# Patient Record
(demographics unavailable — no encounter records)

---

## 2025-03-12 NOTE — DISCUSSION/SUMMARY
[FreeTextEntry1] : 28 yo  lmp  pregnancy confirmation lexie 10/26 H/O  DELIVERY , H/O C/S TOLAC H/O LOVENOX AND ASA W PREGNANCY SONO -siup AT 6.5 FHR POS MFM CONSULT NT TO SCHEDULE WILL START ASA 81 MG , LOVENOX AT 16 WKS LABS IN 4 WKS' PNV ZOFRAN , B6  RTO 1 MO

## 2025-03-12 NOTE — HISTORY OF PRESENT ILLNESS
[FreeTextEntry1] : 28 YO  LMP 25 Patient is here for evaluation amenorrhea  , Urine pregnancy test-  positive  H/O  delivery due to chorio c/s and  ft  [TextBox_4] : GY[facility]X No history of fibroids, cysts, or STDs [PapSmeardate] : 2023

## 2025-03-12 NOTE — HISTORY OF PRESENT ILLNESS
[FreeTextEntry1] : 26 YO  LMP 25 Patient is here for evaluation amenorrhea  , Urine pregnancy test-  positive  H/O  delivery due to chorio c/s and  ft  [TextBox_4] : GYNHX No history of fibroids, cysts, or STDs [PapSmeardate] : 2023

## 2025-03-12 NOTE — PROCEDURE
[Amenorrhea] : Amenorrhea [Transvaginal Ultrasound] : transvaginal ultrasound [FreeTextEntry4] : siup at 6.5 wks fhr pos edd10/20 fhr pos

## 2025-04-21 NOTE — HISTORY OF PRESENT ILLNESS
[FreeTextEntry1] : [facility] Initial Consult Note  Patient is a 28yo  at 13w1d GA PARAMJIT 10/26/25 by LMP consistent with first trimester sono presenting for consultation for APLS. Currently managed by Dr. Hutchinson.   Patient is doing well. As her first pregnancy was complicated by a placental abruption, she was tested for APLS. In her last pregnancy she was found to be positive for [id number]. She was put on lovenox for her prior pregnancy and delivered her second pregnancy full term. She was meant to repeat the labs postpartum, however they were never repeated. She reports that for the past 4 days she has had thick white discharge and vaginal pruritis. She denies contractions, leakage of fluid, vaginal bleeding.  All: NKDA Meds: denies  OB Hx:   P1:  28w CS, suspected abruption, 1280gm P2:  38w , was on lovenox starting 12 weeks for suspected APLS h/o 2 SAB, 1 D+C  Gyn Hx: denies h/o fibroids, ovarian cysts, abnormal PAPs, STIs  PMH: denies  PSH:  delivery  FH: denies SH: currently unemployed, lives with  and children, denies, smoking, alcohol or drug use   Psych: denies  Genetics: denies h/o genetic abnormalities   Physical Exam: BP: 99/67, HR: 102bpm, O2sat: 99%, Wt: 106lbs,  GA: AOx3, NAD  Heart: RRR, no M/R/G  Lungs: CTAB  Abd: soft, nontender, nondistended, gravid  LE: no erythema, edema or pain   Labs:  prenatal labs not yet completed  OBUS:  : S=D; nl NT; tiny 1.8 cm intramural myoma.  A/P: 28yo  at 13w1d GA, suspected APLS  # APLS  - We discussed the criteria for APS (antiphospholipid syndrome) . The diagnosis of APS is made when one clinical and one laboratory criteria are present. The clinical criteria are: 1) vascular thrombosis 2) Pregnancy morbidity as evidenced by one or more unexplained deaths of morphologically normal fetuses at or after 10 weeks gestation, or one or more premature births of morphologically normal neonates before 34 weeks due to severe preeclampsia or placental insufficiency or 3) three or more otherwise unexplained consecutive spontaneous abortions before 10 weeks. The laboratory criteria are: lupus anticoagulant present on two or more occasions at least 12 weeks apart, or anticardiolipin IgG or IgM in medium or high titer (> 40 GPL or MPL), on two or more occasions, at least 12 weeks apart, or beta 2 glycoprotein IgG or IgM antibodies > 99th percentile (> 20) present on two or more occasions at least 12 weeks apart.   - During pregnancy and 6 weeks postpartum prophylactic heparin or Lovenox and Aspirin 81 mg PO daily should be considered even in patients who dont have a personal history of thrombosis. The goal is to reduce the risks of complications of antiphospholipid antibody syndrome such as thrombosis (5-12% risk), preeclampsia (5.5-8 fold increased risk), fetal growth restriction, (15-30% risk) and stillbirth. I also discussed the addition of antepartum surveillance by 32 weeks (ACOG practice bulletin 132). - as patient did not repeat her lab work outside of pregnancy, cannot strictly diagnose APLS as she does not meet criteria - ordered repeat APLS work up, if negative is informative and helps to potentially rule out APLS, if positive will still need to be repeated outside of pregnancy - Lovenox 40mg SQ QD ordered; patient told to start after she has blood drawn, but not to wait for the results before starting Lovenox.  # vaginitis, suspect candida - fu vaginitis performed in office - fluconazole 150mg PO once ordered  # short interval pregnancy  # Pregnancy - Rh neg, recommend rhogam pre routine - recommend routine prenatal labs - AFP at 16 weeks  RTC in 4 weeks to go over lab work results and early anatomy
Yes

## 2025-05-21 NOTE — HISTORY OF PRESENT ILLNESS
[FreeTextEntry1] : [facility] Initial Follow up Note  Patient is a 26yo  at 17w3d GA PARAMJIT 10/26/25 by LMP consistent with first trimester sono presenting for consultation for APLS. Currently managed by Dr. Hutchinson.   Today: Feeling well, denies ctx, lof, vb. + FM. Not taking Lovenox.  Recently moved to [place], has not been able to go to lab for bloodwork because of her move.   History: As her first pregnancy was complicated by a placental abruption, she was tested for APLS. In her last pregnancy she was found to be positive for [id number]. She was put on Lovenox for her prior pregnancy and delivered her second pregnancy full term. She was meant to repeat the labs postpartum, however they were never repeated.   All: NKDA Meds: denies  OB Hx:   P1:  28w CS, suspected abruption, 1280gm P2:  38w , was on lovenox starting 12 weeks for suspected APLS h/o 2 SAB, 1 D+C  Gyn Hx: denies h/o fibroids, ovarian cysts, abnormal PAPs, STIs  PMH: denies  PSH:  delivery  FH: denies SH: currently unemployed, lives with  and children, denies, smoking, alcohol or drug use   Psych: denies  Genetics: denies h/o genetic abnormalities   Physical Exam: BP: 98/62, HR: 93bpm, O2sat: 99%,  GA: AOx3, NAD  Heart: RRR, no M/R/G  Lungs: CTAB  Abd: soft, nontender, nondistended, gravid  LE: no erythema, edema or pain   Labs:  prenatal labs not yet completed  OBUS:  : S=D; nl NT; tiny 1.8 cm intramural myoma. : 17w3d: S=D; nl early anatomy. tiny 2 cm intramural myoma.  A/P: 26yo  at 17w3d GA, suspected APLS; did not repeat labs as ordered.  # APLS  - We discussed the criteria for APS (antiphospholipid syndrome) . The diagnosis of APS is made when one clinical and one laboratory criteria are present. The clinical criteria are: 1) vascular thrombosis 2) Pregnancy morbidity as evidenced by one or more unexplained deaths of morphologically normal fetuses at or after 10 weeks gestation, or one or more premature births of morphologically normal neonates before 34 weeks due to severe preeclampsia or placental insufficiency or 3) three or more otherwise unexplained consecutive spontaneous abortions before 10 weeks. The laboratory criteria are: lupus anticoagulant present on two or more occasions at least 12 weeks apart, or anticardiolipin IgG or IgM in medium or high titer (> 40 GPL or MPL), on two or more occasions, at least 12 weeks apart, or beta 2 glycoprotein IgG or IgM antibodies > 99th percentile (> 20) present on two or more occasions at least 12 weeks apart.   - During pregnancy and 6 weeks postpartum prophylactic heparin or Lovenox and Aspirin 81 mg PO daily should be considered even in patients who dont have a personal history of thrombosis. The goal is to reduce the risks of complications of antiphospholipid antibody syndrome such as thrombosis (5-12% risk), preeclampsia (5.5-8 fold increased risk), fetal growth restriction, (15-30% risk) and stillbirth. I also discussed the addition of antepartum surveillance by 32 weeks (ACOG practice bulletin 132). - as patient did not repeat her lab work outside of pregnancy, cannot strictly diagnose APLS as she does not meet criteria - Last visit: ordered repeat APLS work up, but never done, if negative is informative and helps to potentially rule out APLS, if positive will still need to be repeated outside of pregnancy - Lovenox 40mg SQ QD ordered; patient told to start after she has blood drawn, but not to wait for the results before starting Lovenox. Patient said she will now do the tests. Full prenatal labs ordered as well.  # Vaginitis  - Tested positive for candida, treated with fluconazole (2025)   # short interval pregnancy  # Pregnancy - Rh neg, recommend rhogam per routine - Ordered T1 labs, + NIPT, carrier screen, and afp at today's visit. f/u results  RTC in 3 weeks to go over lab work results and anatomy

## 2025-05-21 NOTE — HISTORY OF PRESENT ILLNESS
[FreeTextEntry1] : [facility] Initial Follow up Note  Patient is a 26yo  at 17w3d GA PARAMJIT 10/26/25 by LMP consistent with first trimester sono presenting for consultation for APLS. Currently managed by Dr. Hutchinson.   Today: Feeling well, denies ctx, lof, vb. + FM. Not taking Lovenox.  Recently moved to [place], has not been able to go to lab for bloodwork because of her move.   History: As her first pregnancy was complicated by a placental abruption, she was tested for APLS. In her last pregnancy she was found to be positive for [id number]. She was put on Lovenox for her prior pregnancy and delivered her second pregnancy full term. She was meant to repeat the labs postpartum, however they were never repeated.   All: NKDA Meds: denies  OB Hx:   P1:  28w CS, suspected abruption, 1280gm P2:  38w , was on lovenox starting 12 weeks for suspected APLS h/o 2 SAB, 1 D+C  Gyn Hx: denies h/o fibroids, ovarian cysts, abnormal PAPs, STIs  PMH: denies  PSH:  delivery  FH: denies SH: currently unemployed, lives with  and children, denies, smoking, alcohol or drug use   Psych: denies  Genetics: denies h/o genetic abnormalities   Physical Exam: BP: 98/62, HR: 93bpm, O2sat: 99%,  GA: AOx3, NAD  Heart: RRR, no M/R/G  Lungs: CTAB  Abd: soft, nontender, nondistended, gravid  LE: no erythema, edema or pain   Labs:  prenatal labs not yet completed  OBUS:  : S=D; nl NT; tiny 1.8 cm intramural myoma. : 17w3d: S=D; nl early anatomy. tiny 2 cm intramural myoma.  A/P: 26yo  at 17w3d GA, suspected APLS; did not repeat labs as ordered.  # APLS  - We discussed the criteria for APS (antiphospholipid syndrome) . The diagnosis of APS is made when one clinical and one laboratory criteria are present. The clinical criteria are: 1) vascular thrombosis 2) Pregnancy morbidity as evidenced by one or more unexplained deaths of morphologically normal fetuses at or after 10 weeks gestation, or one or more premature births of morphologically normal neonates before 34 weeks due to severe preeclampsia or placental insufficiency or 3) three or more otherwise unexplained consecutive spontaneous abortions before 10 weeks. The laboratory criteria are: lupus anticoagulant present on two or more occasions at least 12 weeks apart, or anticardiolipin IgG or IgM in medium or high titer (> 40 GPL or MPL), on two or more occasions, at least 12 weeks apart, or beta 2 glycoprotein IgG or IgM antibodies > 99th percentile (> 20) present on two or more occasions at least 12 weeks apart.   - During pregnancy and 6 weeks postpartum prophylactic heparin or Lovenox and Aspirin 81 mg PO daily should be considered even in patients who dont have a personal history of thrombosis. The goal is to reduce the risks of complications of antiphospholipid antibody syndrome such as thrombosis (5-12% risk), preeclampsia (5.5-8 fold increased risk), fetal growth restriction, (15-30% risk) and stillbirth. I also discussed the addition of antepartum surveillance by 32 weeks (ACOG practice bulletin 132). - as patient did not repeat her lab work outside of pregnancy, cannot strictly diagnose APLS as she does not meet criteria - Last visit: ordered repeat APLS work up, but never done, if negative is informative and helps to potentially rule out APLS, if positive will still need to be repeated outside of pregnancy - Lovenox 40mg SQ QD ordered; patient told to start after she has blood drawn, but not to wait for the results before starting Lovenox. Patient said she will now do the tests. Full prenatal labs ordered as well.  # Vaginitis  - Tested positive for candida, treated with fluconazole (2025)   # short interval pregnancy  # Pregnancy - Rh neg, recommend rhogam per routine - Ordered T1 labs, + NIPT, carrier screen, and afp at today's visit. f/u results  RTC in 3 weeks to go over lab work results and anatomy

## 2025-05-28 NOTE — OB SUMMARY
[Date: _____] : Date: [unfilled] [Gestational Age: _____] : Gestational Age: [unfilled] [FreeTextEntry2] : siup at 18+ wks, ? h/o APLS, for tolac MFM consult appreciated  did not start lovenox yet  will start she is no on ASA  will f/up level 2 in 3 wks prenatal labs , afp today  rto 1 mo [FreeTextEntry1] : Patient is here for follow/up pregnancy weight-   101 B/P-      108/63  pulse-   101 urine  , glucose-  negative , protein-  negative  [de-identified] : go

## 2025-06-11 NOTE — HISTORY OF PRESENT ILLNESS
Bariatric/Medical Weight Loss Progress Report     Referral Diagnosis:   Obesity (BMI 30-39.9) [E66.9]   S/P laparoscopic sleeve gastrectomy [Z98.84]   Intestinal malabsorption following gastrectomy [K91.2, Z90.3]   Nausea [R11.0]   Anxiety [F41.9]     Post op follow up #3    Length of Appointment Time:  26 minutes    Assessment:  Amount of food: Per 24 hour recall: Woke at 5am. Breakfast at 8:15am: [place] Balanced Break snack pack with cheese, cashews, Craisins. Lunch at 11:30am: sandwich on 1 piece wheat bread with turkey, cheese, lettuce. PM snacks: salad with light Italian dressing, 1/2 cup watermelon, 1/4 cup Carbmaster crackers. Dinner at 6:30pm: 1/3 cup lasagna. HS snack: string cheese, skim milk. Bed at 10pm.     Type of food/meals: Patient typically grazes throughout the day. She is a member of bariatric groups on Facebook. She is bored of drinking Premier Protein shakes.      Bariatric Diet: regular  Total Fluid intake: 40 oz Tazo sweetened Obi tea with 6 oz skim milk, 20 oz water with sugar free lemon hydration, 32 oz water, 12 oz Bubblr, 8 oz skim milk.    Total protein intake: 56 g  Protein Supplement: none - bored of drinking Premier Protein shakes  # Feedings/day: 5-8  Size of Meal: 1 cup  Time to Eat: 30 minutes  Gum Use: some  Straw Use: yes  Drinking while eating: no  Fried Foods: few fries  Fast Food: no  Eating Out: 1x/month  Sweets: occasionally - chocolate  Fruit: yes  Nuts: yes  Starches: yes - tries to avoid  Soda: none, does drink Bubblr  Alcohol: special occasions  Allergies/Intolerances: none    Weight loss medication: qsymia  Medications, specify prescription or OTC: prilosec  Vitamins/Minerals: BariatricPal MVI, calcium citrate chews 4x/day    Behavior  Emotional related eating behaviors: reports none    Participation in [place] programs: not addressed     Physical Activity  Type of physical activity: Crossfit 3x/week for 1 hour, walking or biking 3x/week      Anthropometric  [FreeTextEntry1] : 26yo  at 20w3d GA PARAMJIT 10/26/25 by LMP consistent with first trimester sono presenting for f/u of suspected APLS. Currently managed by Dr. Hutchinson.   Today: Feeling well, denies ctx, lof, vb. + FM. Taking Lovenox.    History: As her first pregnancy was complicated by a placental abruption, she was tested for APLS. In her last pregnancy she was found to be positive for [id number]. She was put on Lovenox for her prior pregnancy and delivered her second pregnancy full term. She was meant to repeat the labs postpartum, however they were never repeated. Started Lovenox last visit and repeated APLS - neg.  All: NKDA Meds: denies  OB Hx:   P1:  28w CS, suspected abruption, 1280gm P2:  38w , was on lovenox starting 12 weeks for suspected APLS h/o 2 SAB, 1 D+C  Gyn Hx: denies h/o fibroids, ovarian cysts, abnormal PAPs, STIs  PMH: denies  PSH:  delivery  FH: denies SH: currently unemployed, lives with  and children, denies, smoking, alcohol or drug use   Psych: denies  Genetics: denies h/o genetic abnormalities   Physical Exam: BP: 105/68, HR: 91bpm, O2sat: 100%,  GA: AOx3, NAD   Labs:  prenatal labs: Normal MCV & Hemoglobin electrophoresis (last preg). Hb 10.7 HbA1c 4.7% HBsAg/Hep C neg, Rubella N/A. HIV//RPR no result yet. Blood type AB neg; antibody screen negative. Panorama negative for Trisomy 13, 18 & 21, common microdeletion panel; fetal fraction 21.9% Female fetus.  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 : 17w3d: S=D; nl early anatomy. tiny 2 cm intramural myoma. : 20w3d: S=D; nl anatomy. tiny 2 cm intramural myoma. Possible echogenic kidneys - re-eval in 3 weeks.  A/P: 26yo  at 20w3d GA, suspected APLS; did not repeat labs as ordered.  # APLS  - We discussed the criteria for APS (antiphospholipid syndrome) . The diagnosis of APS is made when one clinical and one laboratory criteria are present. The clinical criteria are: 1) vascular thrombosis 2) Pregnancy morbidity as evidenced by one or more unexplained deaths of morphologically normal fetuses at or after 10 weeks gestation, or one or more premature births of morphologically normal neonates before 34 weeks due to severe preeclampsia or placental insufficiency or 3) three or more otherwise unexplained consecutive spontaneous abortions before 10 weeks. The laboratory criteria are: lupus anticoagulant present on two or more occasions at least 12 weeks apart, or anticardiolipin IgG or IgM in medium or high titer (> 40 GPL or MPL), on two or more occasions, at least 12 weeks apart, or beta 2 glycoprotein IgG or IgM antibodies > 99th percentile (> 20) present on two or more occasions at least 12 weeks apart.   - During pregnancy and 6 weeks postpartum prophylactic heparin or Lovenox and Aspirin 81 mg PO daily should be considered even in patients who dont have a personal history of thrombosis. The goal is to reduce the risks of complications of antiphospholipid antibody syndrome such as thrombosis (5-12% risk), preeclampsia (5.5-8 fold increased risk), fetal growth restriction, (15-30% risk) and stillbirth. I also discussed the addition of antepartum surveillance by 32 weeks (ACOG practice bulletin 132). - as patient did not repeat her lab work outside of pregnancy, cannot strictly diagnose APLS as she does not meet criteria - Repeat APLS work up, negative; potentially ruled out APLS. - Lovenox 40mg SQ QD ordered; patient told to start after she has blood drawn, but not to wait for the results before starting Lovenox. Patient is taking Lovenox and desires to continue despite repeat APLS labs negative  # Anemia: Hb 10.7 Rx Fe polysaccharide 150 mg qd and Vitamin C 500 mg BID.   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 - reports she got req for partner from [facility] yesterday  RTC in 3 weeks for sono - transferring to [place]   Measurements:   Height: 5' 1.75\"  Initial weight: 219#  Lowest weight: 150#  Personal goal weight: 140-145#    Weight on:  1/16/24: 169#  3/19/24: 172#  6/19/24: 171#    Biochemical Data, Medical Tests, and Procedures  1/31/2017- s/p gastric sleeve    Recent labs reviewed:  Creatinine (mg/dL)   Date Value   03/06/2024 1.04 (H)     Cholesterol (mg/dL)   Date Value   03/06/2024 239 (H)     LDL (mg/dL)   Date Value   03/06/2024 134 (H)     Vitamin D  Date  03/06/2024  25.6 (L)     Nutrition-Focused Physical Findings:   Diarrhea: none  Constipation: none  Hair loss: none  Dumping: none  Nausea/Vomiting: none     Client History:   Past Medical History:   Diagnosis Date    Dysmenorrhea     Headache(784.0)     Hypercholesterolemia 01/27/2017    Menorrhagia     Obstructive sleep apnea     cpap    Severe obesity (BMI 35.0-35.9 with comorbidity)  (CMD) 01/31/2017    Status post sleeve gastrectomy 1/31/17    Thiamine deficiency 01/27/2017    Vitamin D deficiency 01/27/2017     Nutrition Diagnosis:   Altered GI function related to sleeve gastrectomy as evidenced by OR report on 1/31/17.    Nutrition Prescription:   Eat 3 small meals per day, Drink 48-64 ounces of liquids per day, No carbonated beverages, Choose foods low in sugar and fat, Take vitamins as directed, Aim for 60-80 grams of protein per day, Eat meals very slowly and Be physically active.     Intervention:   Recommended modifications:   Follow liver reduction diet, try making recipes  Aim for  grams protein per day  Try [id number], Gatorade Zero with protein, Dannon light and fit Greek yogurt, powdered peanut butter  Decrease to 3 calcium citrate chews per day    Print/Written Resources Provided:   AVS  Liver Reduction Diet  Bariatric Recipes    Monitoring and Evaluation:   Self-reported adherence score: The patient will comply with interventions in order to facilitate weight loss and maintain nutrition status.    The instruction was given to the  patient.  Areas and level of knowledge: Patient demonstrates an inadequate level of knowledge prior to education and basic level of knowledge after education.  The barriers to self-care and learning limitations were assessed as none.  Preferences for learning: No preference    Learning Topics: Rationale for Diet, Guidelines for Diet, Label Reading/Product Information, Food Preparation, Eating Out and Lifestyle changes     Recommended Follow-up:   Per provider

## 2025-06-25 NOTE — OB SUMMARY
[Date: _____] : Date: [unfilled] [Gestational Age: _____] : Gestational Age: [unfilled] [FreeTextEntry2] : siup at 22.3 wks doing well Marly carrier- fob to test rto 4 wks [FreeTextEntry1] : Patient is here for follow/up pregnancy weight-   103 B/P-    103/62   pulse-    96  urine , glucose- protein-   [de-identified] : GO